# Patient Record
Sex: MALE | Race: WHITE | NOT HISPANIC OR LATINO | Employment: UNEMPLOYED | URBAN - METROPOLITAN AREA
[De-identification: names, ages, dates, MRNs, and addresses within clinical notes are randomized per-mention and may not be internally consistent; named-entity substitution may affect disease eponyms.]

---

## 2022-11-06 ENCOUNTER — HOSPITAL ENCOUNTER (EMERGENCY)
Facility: CLINIC | Age: 37
Discharge: HOME | End: 2022-11-06
Attending: EMERGENCY MEDICINE | Admitting: EMERGENCY MEDICINE

## 2022-11-06 VITALS
WEIGHT: 210 LBS | BODY MASS INDEX: 29.4 KG/M2 | SYSTOLIC BLOOD PRESSURE: 154 MMHG | OXYGEN SATURATION: 96 % | HEART RATE: 93 BPM | RESPIRATION RATE: 16 BRPM | TEMPERATURE: 97.6 F | DIASTOLIC BLOOD PRESSURE: 99 MMHG | HEIGHT: 71 IN

## 2022-11-06 DIAGNOSIS — S03.00XA JAW DISLOCATION, INITIAL ENCOUNTER: ICD-10-CM

## 2022-11-06 PROCEDURE — 21480 CLTX TMPRMAND DISLC 1ST/SBSQ: CPT | Mod: RT

## 2022-11-06 PROCEDURE — 99285 EMERGENCY DEPT VISIT HI MDM: CPT

## 2022-11-06 NOTE — ED PROVIDER NOTES
EMERGENCY DEPARTMENT ENCOUNTER      NAME: Husam Kaiser  AGE: 37 year old male  YOB: 1985  MRN: [id number]  EVALUATION DATE & TIME: No admission date for patient encounter.    PCP: No primary care provider on file.    ED PROVIDER: Seun Mirza MD    Chief Complaint   Patient presents with     Jaw Pain     FINAL IMPRESSION:  1. Jaw dislocation, initial encounter      ED COURSE & MEDICAL DECISION MAKING:    Pertinent Labs & Imaging studies reviewed. (See chart for details)  37 year old male presents to the Emergency Department for evaluation of recurrent jaw dislocation.  I assessed the patient on in triage due to boarding and capacity issues.  Exam was consistent with right-sided jaw dislocation.  This is a recurrent issue for the patient.  He fortunately typically is able to get it reduced with assistance of acute care staff without needing sedation.  Recommended and patient consented to proceed with attempt at reduction in triage.  With downward posterior pressure on the right side I did feel the jaw spontaneously reduced and the patient then was able to speak and close his mouth.  He felt significantly improved.  I did not feel that imaging was indicated at this time.  Patient is followed by ENT.  He already knows strategies to avoid potential redislocation.  Plan of care for discharge with ENT follow-up on outpatient basis patient comfortable this plan of care.     11:22 AM I met with the patient, obtained history, performed an initial exam, and discussed options and plan for diagnostics and treatment here in the ED. PPE worn: surgical mask, gloves   11:30 AM Plan to discharge.     At the conclusion of the encounter I discussed the results of all of the tests and the disposition. The questions were answered. The patient or family acknowledged understanding and was agreeable with the care plan.     Medical Decision Making    Supplemental history from: N/A    External Record(s) Reviewed:  N/A    Differential Diagnosis: See MDM charting for differential considered.     I performed an independent interpretation of the: N/A    Discussed with radiology regarding test interpretation: N/A    Discussion of management with another provider: See ED Course and N/A    The following testing was considered but ultimately not selected: None     I considered prescription management with: N/A    The patient's care impacted: None    Consideration of Admission/Observation: N/A - Patient discharged without consideration for admission    Care significantly affected by Social Determinants of Health including: N/A    MEDICATIONS GIVEN IN THE EMERGENCY:  Medications - No data to display    NEW PRESCRIPTIONS STARTED AT TODAY'S ER VISIT  New Prescriptions    No medications on file          =================================================================    HPI    Patient information was obtained from: Patient     Use of : N/A        Husam Kaiser is a 37 year old male with a pertinent history of asthma who presents to this ED by walk in for evaluation of jaw pain.  Patient has a history of chronic recurrent jaw dislocation followed by ENT.  Typically is able to get it reduced in the emergency department without sedation.  Presents today with what he describes as a recurrent jaw dislocation.  Has pain inability to close mouth he thinks is on the right side.  No trauma.  Denies additional symptoms.      REVIEW OF SYSTEMS   Review of Systems   HENT:        Positive for jaw pain.       PAST MEDICAL HISTORY:  History reviewed. No pertinent past medical history.    PAST SURGICAL HISTORY:  History reviewed. No pertinent surgical history.        CURRENT MEDICATIONS:    No current outpatient medications on file.      ALLERGIES:  No Known Allergies    FAMILY HISTORY:  History reviewed. No pertinent family history.    SOCIAL HISTORY:   Social History     Socioeconomic History     Marital status:   "      VITALS:  BP (!) 154/99   Pulse 93   Temp 97.6  F (36.4  C) (Temporal)   Resp 16   Ht 1.803 m (5' 11\")   Wt 95.3 kg (210 lb)   SpO2 96%   BMI 29.29 kg/m      PHYSICAL EXAM    PHYSICAL EXAM    VITAL SIGNS: BP (!) 154/99   Pulse 93   Temp 97.6  F (36.4  C) (Temporal)   Resp 16   Ht 1.803 m (5' 11\")   Wt 95.3 kg (210 lb)   SpO2 96%   BMI 29.29 kg/m    Constitutional:  Well developed, well nourished  EYES: Conjunctivae clear, no discharge  HENT: Atraumatic, normocephalic, bilateral external ears normal.  Oropharynx moist. Nose normal.  Exam consistent with probable right jaw dislocation.  Neck: Normal ROM , Supple   Respiratory:  No respiratory distress, normal nonlabored respirations.   Cardiovascular:  Distal perfusion appears intact  Musculoskeletal:  No edema appreciated, Good range of motion in all major joints.   Integument:  Warm, Dry, No erythema, No rash.   Neurologic:  Alert and oriented. No focal deficits noted.  Ambulatory  Psychiatric:  Affect normal, Judgment normal, Mood normal.    PROCEDURES:   [facility]    Jaw Dislocation    Date/Time: 11/6/2022 11:38 AM  Performed by: Seun Mirza MD  Authorized by: Seun Mirza MD     Emergent condition/consent implied      PRE PROCEDURE DETAILS      Injury location:  R TMJ    Chronicity:  Recurrent    Range of motion: reduced      PROCEDURE DETAILS      Manipulation performed: yes      Jaw reduction method:  Downward posterior pressure    Reduction successful: yes      POST PROCEDURE DETAILS      Range of motion: improved        PROCEDURE    Patient Tolerance:  Patient tolerated the procedure well with no immediate complications            I, Alistair Escobedo , am serving as a scribe to document services personally performed by Seun Mirza MD, based on my observation and the provider's statements to me. I, Seun Mirza MD, attest that Alistair Escobedo is acting in a scribe capacity, has observed my performance " of the services and has documented them in accordance with my direction.    Seun Mirza MD  [facility] EMERGENCY ROOM  [address] [address] [phone]     Seun Mirza MD  11/06/22 2228

## 2025-05-22 ENCOUNTER — HOSPITAL ENCOUNTER (EMERGENCY)
Facility: CLINIC | Age: 40
Discharge: HOME | End: 2025-05-22
Attending: EMERGENCY MEDICINE | Admitting: EMERGENCY MEDICINE
Payer: COMMERCIAL

## 2025-05-22 VITALS
HEART RATE: 88 BPM | DIASTOLIC BLOOD PRESSURE: 95 MMHG | WEIGHT: 205 LBS | RESPIRATION RATE: 22 BRPM | HEIGHT: 69 IN | TEMPERATURE: 97.1 F | SYSTOLIC BLOOD PRESSURE: 142 MMHG | BODY MASS INDEX: 30.36 KG/M2 | OXYGEN SATURATION: 96 %

## 2025-05-22 DIAGNOSIS — S03.00XA JAW DISLOCATION, INITIAL ENCOUNTER: ICD-10-CM

## 2025-05-22 PROCEDURE — 21480 CLTX TMPRMAND DISLC 1ST/SBSQ: CPT

## 2025-05-22 PROCEDURE — 99284 EMERGENCY DEPT VISIT MOD MDM: CPT | Mod: 25

## 2025-05-22 ASSESSMENT — COLUMBIA-SUICIDE SEVERITY RATING SCALE - C-SSRS
1. IN THE PAST MONTH, HAVE YOU WISHED YOU WERE DEAD OR WISHED YOU COULD GO TO SLEEP AND NOT WAKE UP?: NO
2. HAVE YOU ACTUALLY HAD ANY THOUGHTS OF KILLING YOURSELF IN THE PAST MONTH?: NO
6. HAVE YOU EVER DONE ANYTHING, STARTED TO DO ANYTHING, OR PREPARED TO DO ANYTHING TO END YOUR LIFE?: NO

## 2025-05-22 ASSESSMENT — ACTIVITIES OF DAILY LIVING (ADL): ADLS_ACUITY_SCORE: 41

## 2025-05-22 NOTE — DISCHARGE INSTRUCTIONS
Please call your ENT later today and advise of your recurrent dislocation and ask if they would like to see you in clinic  Tylenol 650 mg every 4 hours as needed for pain  Ibuprofen 600 mg every 6 hours as needed for pain

## 2025-05-22 NOTE — ED PROVIDER NOTES
EMERGENCY DEPARTMENT ENCOUnter      NAME: Husam Kaiser  AGE: 40 year old male  YOB: 1985  MRN: [id number]  EVALUATION DATE & TIME: 5/22/2025  3:00 AM    PCP: [facility]    ED PROVIDER: Aislinn Vazquez MD      Chief Complaint   Patient presents with    Jaw Pain         FINAL IMPRESSION:  1. Jaw dislocation, initial encounter          ED COURSE & MEDICAL DECISION MAKING:      In summary, the patient is a 40-year-old male that presents to the emergency department for evaluation of a recurrent jaw dislocation.  His jaw dislocation was easily reduced on first attempt.  The patient will contact his ENT to discuss future management.  3:07 AM I introduced myself to the patient, obtained patient history, performed a physical exam, and discussed plan for ED workup including potential diagnostic laboratory/imaging studies and interventions.  Patient has had reductions without sedation on his previous visits to the emergency department.  This again was the patient's request.  With inferior displacement of his jaw, his dislocation was easily reduced.  Offered pain medication which the patient declined.    Medical Decision Making  I reviewed the EMR: Outpatient Record: previous ED record  Discharge. No recommendations on prescription strength medication(s). See documentation for any additional details.    MIPS (CTPE, Dental pain, Polanco, Sinusitis, Asthma/COPD, Head Trauma): Not Applicable    SEPSIS: None          At the conclusion of the encounter I discussed the results of all of the tests and the disposition. The questions were answered. The patient or family acknowledged understanding and was agreeable with the care plan.         MEDICATIONS GIVEN IN THE EMERGENCY:  Medications - No data to display    NEW PRESCRIPTIONS STARTED AT TODAY'S ER VISIT  New Prescriptions    No medications on file          =================================================================    HPI         Husam Kaiser is a 40 year old male with a pertinent history of asthma who presents to this ED by walk in for evaluation of a jaw problem.    The patient reports around 0230 this morning he yawned and his jaw locked up. He has been unable to close his jaw since it locked up. He endorses mild pain at the mandibular angles. He reports this has happened before and he was reduced in the ED. He tried to self reduce at home without success.      REVIEW OF SYSTEMS     Constitutional:  Denies fever or chills  HENT:  Denies sore throat. Endorses jaw pain/problem.  Respiratory:  Denies cough or shortness of breath   Cardiovascular:  Denies chest pain or palpitations  GI:  Denies abdominal pain, nausea, or vomiting  Musculoskeletal:  Denies any new extremity pain   Skin:  Denies rash   Neurologic:  Denies headache, focal weakness or sensory changes    All other systems reviewed and are negative      PAST MEDICAL HISTORY:  History reviewed. No pertinent past medical history.    PAST SURGICAL HISTORY:  History reviewed. No pertinent surgical history.        CURRENT MEDICATIONS:    No current outpatient medications on file.      ALLERGIES:  No Known Allergies    FAMILY HISTORY:  History reviewed. No pertinent family history.    SOCIAL HISTORY:   Social History     Socioeconomic History    Marital status:      Spouse name: None    Number of children: None    Years of education: None    Highest education level: None     Social Drivers of Health     Financial Resource Strain: Not At Risk (1/26/2024)    Received from [facility]    Financial Resource Strain     Is it hard for you to pay for the very basics like food, housing, medical care or heating?: No   Food Insecurity: Food Insecurity Present (2/18/2025)    Received from [facility]    Hunger Vital Sign     Worried About Running Out of Food in the Last Year: Sometimes true     Ran Out of Food in the Last Year: Sometimes true   Transportation Needs: No  "Transportation Needs (2/18/2025)    Received from [facility]    [facility] - Transportation     Lack of Transportation (Medical): No     Lack of Transportation (Non-Medical): No   Housing Stability: Low Risk  (2/18/2025)    Received from [employer]    Housing Stability Vital Sign     Unable to Pay for Housing in the Last Year: No     Number of Times Moved in the Last Year: 0     Homeless in the Last Year: No       VITALS:  Patient Vitals for the past 24 hrs:   BP Temp Temp src Pulse Resp SpO2 Height Weight   05/22/25 0301 (!) 148/103 -- -- 94 -- 97 % -- --   05/22/25 0258 -- 97.1  F (36.2  C) Temporal -- 22 -- 1.753 m (5' 9\") 93 kg (205 lb)       PHYSICAL EXAM    Constitutional:  Well developed, Well nourished,  HENT:  Normocephalic, Atraumatic, Bilateral external ears normal, Oropharynx moist, Nose normal.  Unable to close mouth secondary to jaw dislocation  Neck:  Normal range of motion, No meningismus, No stridor.   Eyes:  EOMI, Conjunctiva normal, No discharge.   Respiratory:  Normal breath sounds, No respiratory distress, No wheezing, No chest tenderness.   Cardiovascular:  Normal heart rate, Normal rhythm, No murmurs  GI:  Soft, No tenderness, No guarding,   Musculoskeletal:  Neurovascularly intact distally, No edema, No tenderness, No cyanosis, Good range of motion in all major joints.   Integument:  Warm, Dry, No erythema, No rash.   Lymphatic:  No lymphadenopathy noted.   Neurologic:  Alert & oriented  Normal motor function,  No focal deficits noted.   Psychiatric:  Affect normal, Judgment normal, Mood normal.            PROCEDURE: Reduction   INDICATIONS: Jaw Dislocation   PROCEDURE PROVIDER: Dr Aislinn Vazquez   CONSENT: Risks, benefits and alternatives were discussed with and Verbal consent was obtained from Patient.   MEDICATIONS: None   REDUCTION PROCEDURE DESCRIPTION: Using caudal pressure on both sides of his jaw, his dislocation was easily reduced   COMPLICATIONS:  Patient tolerated " procedure well, without complication         I, Judd Araya, am serving as a scribe to document services personally performed by Dr. Vazquez based on my observation and the provider's statements to me. I, Aislinn Vazquez MD attest that Judd Araya is acting in a scribe capacity, has observed my performance of the services and has documented them in accordance with my direction.    Aislinn Vazquez MD  Emergency Medicine  [facility] EMERGENCY ROOM  [address] [address]  [phone]  Dept: [phone]     Aislinn Vazquez MD  05/22/25 0320

## 2025-05-22 NOTE — ED TRIAGE NOTES
Patient presents to the ED complaining of lockjaw that happened approximately 30 minutes ago when he yawned.  He states he has had it four times in the past.  No medication in the past six hours.       Triage Assessment (Adult)       Row Name 05/22/25 0259          Triage Assessment    Airway WDL WDL        Respiratory WDL    Respiratory WDL WDL        Skin Circulation/Temperature WDL    Skin Circulation/Temperature WDL WDL        Cardiac WDL    Cardiac WDL WDL        Peripheral/Neurovascular WDL    Peripheral Neurovascular WDL WDL        Cognitive/Neuro/Behavioral WDL    Cognitive/Neuro/Behavioral WDL WDL